# Patient Record
(demographics unavailable — no encounter records)

---

## 2025-04-09 NOTE — HISTORY OF PRESENT ILLNESS
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 He also presents for at risk foot care in the setting of diabetes with neuropathy. The patient presents with his daughter who aids in [place] translation during today's encounter. He recently was discharged from [facility] and is finishing a course of oral antibiotics that was prescribed status post surgery. Of note, the patient has been type 2 diabetic for over 10 years with an unknown recent A1C, however with his fasting blood glucose under 100 in the morning. He is currently ambulating in a surgical shoe and denies any recent nausea, vomiting, fever, chills, shortness of breath or calf pain. He also denies any pain to the surgical site.

## 2025-04-09 NOTE — PHYSICAL EXAM
[General Appearance - Alert] : alert [Ankle Swelling (On Exam)] : present [Ankle Swelling On The Left] : of the left ankle [1+] : left foot dorsalis pedis 1+ [Vibration Dec.] : diminished vibratory sensation at the level of the toes [Diminished Throughout Right Foot] : diminished sensation with monofilament testing throughout right foot [Diminished Throughout Left Foot] : diminished sensation with monofilament testing throughout left foot [Varicose Veins Of Lower Extremities] : not present [] : not present [Delayed in the Right Toes] : capillary refills normal in right toes [Delayed in the Left Toes] : capillary refills normal in the left toes [de-identified] : The left foot is status post partial 5th ray amputation. Rectus foot structure is noted off weight bearing. There are mild hammertoe contractures noted to the lesser digits bilaterally.  [FreeTextEntry1] : The left foot surgical site is noted to be well coapted with sutures at the 5th ray area. There is no dehiscence with no drainage or purulence and no signs of acute infection. The previous ulceration at the dorsolateral left foot is noted to be healed with no residual drainage.

## 2025-04-09 NOTE — ASSESSMENT
[FreeTextEntry1] : Exam and evaluation were performed.   Radiographs (3v of bilateral foot) were reviewed to show evidence of previous left foot surgery with no other acute findings.   The patient presents for post operative care as well as for at risk foot care in the setting of diabetes with neuropathy. Regarding the left foot, he is now status post partial 5th ray amputation with the amputation site healing uneventfully. Sutures were removed without incident. He was cleared to get the left foot wet in the upcoming few days. He is to continue weight bearing with use of the surgical shoe to the left foot at all times. Long term prognosis was discussed. He would likely benefit from diabetic shoes with a  the future to prevent re-ulceration.   Regarding his diabetes, I discussed effects of diabetes on the feet.  Patient educated on the effects of PAD and Neuropathy. Diabetic education provided highlighting the need for daily foot checks, ulcer prevention and use of accommodating shoes. Treatment rendered as above with debridement performed.  The importance of routine follow up and preventative care was discussed. All questions were answered to patient's satisfaction. PTR in 2 weeks for follow up and reassessment to ensure full healing of the left foot. IF this is the case, preventative care will be continued going forward.

## 2025-04-23 NOTE — ASSESSMENT
[FreeTextEntry1] : Exam and evaluation were performed.   The patient presents for multiple issues: 1) post operative care now status post partial 5th ray amputation and 2) at risk foot care in the setting of diabetes with neuropathy with a pre ulcerative lesion at the lateral 5th MPJ of the right foot. Regarding the left foot, he is now status post partial 5th ray amputation with the amputation site fully healed. He is able to transition into a closed shoe as tolerated. He would likely benefit from diabetic shoes with a  the future to prevent re-ulceration. This was explained to the patient and his family member and they demonstrate understanding.   Regarding the right foot, this area demonstrates a small pre ulcerative lesion. Reasons for this were discussed. Debridement of the area was performed (56791) which was tolerated well. The importance of routine follow up and preventative care was discussed. All questions were answered to patient's satisfaction. PTR in 2 months for follow up and reassessment for continued at risk care. Should anything change in the interim, he is to follow up for a sooner appointment.

## 2025-04-23 NOTE — PHYSICAL EXAM
[General Appearance - Alert] : alert [Ankle Swelling (On Exam)] : present [Ankle Swelling On The Left] : of the left ankle [Varicose Veins Of Lower Extremities] : not present [] : not present [Delayed in the Right Toes] : capillary refills normal in right toes [Delayed in the Left Toes] : capillary refills normal in the left toes [1+] : left foot dorsalis pedis 1+ [de-identified] : The left foot is status post partial 5th ray amputation. Rectus foot structure is noted off weight bearing. There are mild hammertoe contractures noted to the lesser digits bilaterally.  [FreeTextEntry1] : The left foot surgical site is noted to be well coapted at the 5th ray area. There is no dehiscence with no drainage or purulence and no signs of acute infection. The previous ulceration at the dorsolateral left foot is noted to be healed with no residual drainage.  On the right foot, there is a pre ulcerative lesion around the lateral 5th MPJ with overlying hypertrophic skin. Upon debridement of the area, there are no open wounds or signs of acute infection.  [Vibration Dec.] : diminished vibratory sensation at the level of the toes [Diminished Throughout Right Foot] : diminished sensation with monofilament testing throughout right foot [Diminished Throughout Left Foot] : diminished sensation with monofilament testing throughout left foot

## 2025-04-23 NOTE — HISTORY OF PRESENT ILLNESS
[FreeTextEntry1] : 71 year old male presents to the office for a follow up visit for continued management of multiple issues: 1) he is status post partial 5th ray amputation on the left foot, and 2) he has a small pre ulcerative lesion on the right foot. He presents with his daughter at the time of examination and denies any recent nausea, vomiting, fever, chills, shortness of breath or calf pain. He relates minimal pain at the surgical site and is ambulating in a surgical shoe.